# Patient Record
(demographics unavailable — no encounter records)

---

## 2024-10-08 NOTE — HISTORY OF PRESENT ILLNESS
[de-identified] : ear pain [FreeTextEntry6] :  At school today pt c/o right ear pain. had T about 99   Pt with recent c/c

## 2024-11-22 NOTE — PHYSICAL EXAM
[Erythematous Oropharynx] : erythematous oropharynx [Enlarged Tonsils] : enlarged tonsils [NL] : warm, clear [Vesicles] : no vesicles [Exudate] : no exudate [Ulcerative Lesions] : no ulcerative lesions [Palate petechiae] : palate without petechiae [de-identified] : tonsils +3

## 2024-11-22 NOTE — HISTORY OF PRESENT ILLNESS
[de-identified] : sore throat, ear pain [FreeTextEntry6] : BIB mother for sore throat x2 days, L ear pain x1 day. + strep at home. No medicine taken today. No fever. No difficulty breathing, cough, congestion. No v/d. No rash. No fatigue. Good po/uop/bm. Normal sleep and activity.

## 2024-12-06 NOTE — DISCUSSION/SUMMARY
[FreeTextEntry1] : - Pt / family were notified that rapid strep pharyngitis testing was POSITIVE.  Strep pharyngitis etiology, natural course, possible complications, and treatment options were discussed.  Pt will start antibiotic therapy as ordered.   -Will start pt on Cephalexin.  - Discussed with pt /family that pt is contagious for 24 hours after start of antibiotic therapy and the importance of completing full course of antibiotic therapy.   - Recommended replacement of oral care products after three days of antibiotic therapy to reduce risk reinoculation with strep.  - Observe for signs/symptoms of strep in pt contacts.  - Family to call back if not better in 3 days or worsens.   d/t recurrence will send out culture to confirm diagnosis. Will follow up when results available.

## 2024-12-06 NOTE — HISTORY OF PRESENT ILLNESS
[FreeTextEntry6] : Pt BIB Mother for c/o belly ache and LG fever today at  tmax 99.6 decreased appetite but tolerating fluids s/p recent strep throat - completed course of amoxicillin on Monday 12/2 mild congestion denies cough, vomiting, diarrhea normal activity and sleep

## 2024-12-06 NOTE — PHYSICAL EXAM
[Erythematous Oropharynx] : erythematous oropharynx [Enlarged Tonsils] : enlarged tonsils [NL] : warm, clear [de-identified] : tonsils 3+

## 2024-12-13 NOTE — PHYSICAL EXAM
[NL] : warm, clear [de-identified] : no cough assessed in exam room  [FreeTextEntry7] : RLL crackles/intermittent expiratory wheezing

## 2024-12-13 NOTE — HISTORY OF PRESENT ILLNESS
[de-identified] : fever, rash, cough [FreeTextEntry6] : BIB mother for fever to 102 overnight. Intermittent wet cough x3-4 days. No medicine taken today. Patient currently on keflex for persistent strep s/p initial tx with amox. mother reports hives on upper L thigh last night, gone this morning.  No difficulty breathing. No v/d. No fatigue. Good po/uop/bm. Normal sleep and activity.

## 2024-12-13 NOTE — DISCUSSION/SUMMARY
[FreeTextEntry1] : Anticipatory guidance and parent education given.  CXR now- r/o atypical PNA before treating with azithromycin d/t recent abx use  Rash likely viral and not related to allergy  May use Tylenol or Ibuprofen as needed for fever or discomfort. Recommend supportive care including increased fluids, rest, and nasal saline followed by nasal suction.  Return if symptoms worsen or persist.

## 2025-05-02 NOTE — PHYSICAL EXAM

## 2025-05-02 NOTE — HISTORY OF PRESENT ILLNESS
[Mother] : mother [Normal] : Normal [Brushing teeth] : Brushing teeth [Yes] : Patient goes to dentist yearly [Up to date] : Up to date [FreeTextEntry7] : h/o wheeze- no recent sx's [de-identified] : pt with loud PM breathing; + disrupted sleep with freq awakenings [de-identified] : varied foods

## 2025-05-29 NOTE — PLAN
[TextEntry] : Discussed at length potential tx options including prophylactic dose of oral doxy vs observation for onset of sx's of Lyme and checking titer in 3-4 weeks. mom to consider options and call back.

## 2025-05-29 NOTE — HISTORY OF PRESENT ILLNESS
[de-identified] : s/p tick bite [FreeTextEntry6] :  Mom removed an engorged tick from pt's right groin last kt. Duration of attachment unknown  Pt asymptomatic